# Patient Record
Sex: FEMALE | Race: BLACK OR AFRICAN AMERICAN | Employment: UNEMPLOYED | URBAN - METROPOLITAN AREA
[De-identification: names, ages, dates, MRNs, and addresses within clinical notes are randomized per-mention and may not be internally consistent; named-entity substitution may affect disease eponyms.]

---

## 2017-03-09 ENCOUNTER — HOSPITAL ENCOUNTER (EMERGENCY)
Age: 47
Discharge: HOME | End: 2017-03-09
Attending: EMERGENCY MEDICINE | Admitting: EMERGENCY MEDICINE
Payer: MEDICAID

## 2017-03-09 VITALS
SYSTOLIC BLOOD PRESSURE: 144 MMHG | WEIGHT: 190 LBS | DIASTOLIC BLOOD PRESSURE: 98 MMHG | BODY MASS INDEX: 32.44 KG/M2 | RESPIRATION RATE: 14 BRPM | HEIGHT: 64 IN | TEMPERATURE: 98.1 F | HEART RATE: 74 BPM | OXYGEN SATURATION: 100 %

## 2017-03-09 DIAGNOSIS — K04.7 DENTAL ABSCESS: Primary | ICD-10-CM

## 2017-03-09 PROCEDURE — 99282 EMERGENCY DEPT VISIT SF MDM: CPT

## 2017-03-09 RX ORDER — HYDROCODONE BITARTRATE AND ACETAMINOPHEN 5; 325 MG/1; MG/1
TABLET ORAL
Qty: 20 TAB | Refills: 0 | Status: SHIPPED | OUTPATIENT
Start: 2017-03-09

## 2017-03-09 RX ORDER — IBUPROFEN 800 MG/1
800 TABLET ORAL EVERY 8 HOURS
Qty: 15 TAB | Refills: 0 | Status: SHIPPED | OUTPATIENT
Start: 2017-03-09 | End: 2017-03-14

## 2017-03-09 RX ORDER — PENICILLIN V POTASSIUM 500 MG/1
500 TABLET, FILM COATED ORAL 4 TIMES DAILY
Qty: 40 TAB | Refills: 0 | Status: SHIPPED | OUTPATIENT
Start: 2017-03-09 | End: 2017-03-19

## 2017-03-09 RX ORDER — CLINDAMYCIN HYDROCHLORIDE 300 MG/1
300 CAPSULE ORAL 4 TIMES DAILY
Qty: 40 CAP | Refills: 0 | Status: SHIPPED | OUTPATIENT
Start: 2017-03-09 | End: 2017-03-19

## 2017-03-09 NOTE — ED TRIAGE NOTES
Patient states she has a \"lump\" on the upper roof of her mouth, onset 2 days    Vaginal discharge and bilateral knee pain

## 2017-03-09 NOTE — DISCHARGE INSTRUCTIONS

## 2017-03-09 NOTE — ED NOTES
Patient stated understanding of discharge instructions. Patient was ambulatory upon discharge. Patient received four prescriptions.     Patient armband removed and shredded

## 2017-03-09 NOTE — ED PROVIDER NOTES
Patient is a 42 y.o. female presenting with gum problem, vaginal discharge, and knee pain. The history is provided by the patient. Gum Problem        Vaginal Discharge    Pertinent negatives include no fever. Knee Pain         Pt presents today w/central complaint of dental pain and painful abscess just behind her upper central incisors. No meds other than ibuprofen taken for relief. Denies fever, drooling. Denies ETOH, illicits. +tobacco use. LNMP less than 2 weeks ago. History reviewed. No pertinent past medical history. Past Surgical History:   Procedure Laterality Date    HX GYN      fibroids         History reviewed. No pertinent family history. Social History     Social History    Marital status:      Spouse name: N/A    Number of children: N/A    Years of education: N/A     Occupational History    Not on file. Social History Main Topics    Smoking status: Current Every Day Smoker     Packs/day: 0.25    Smokeless tobacco: Not on file    Alcohol use Yes      Comment: occ    Drug use: No    Sexual activity: Not on file     Other Topics Concern    Not on file     Social History Narrative         ALLERGIES: Review of patient's allergies indicates no known allergies. Review of Systems   Constitutional: Negative for fever. HENT: Positive for dental problem. Negative for drooling. Genitourinary: Positive for vaginal discharge. All other systems reviewed and are negative. Vitals:    03/09/17 0720   BP: (!) 144/98   Pulse: 74   Resp: 14   Temp: 98.1 °F (36.7 °C)   SpO2: 100%   Weight: 86.2 kg (190 lb)   Height: 5' 4\" (1.626 m)            Physical Exam   Constitutional: She is oriented to person, place, and time. She appears well-developed. HENT:   Head: Normocephalic and atraumatic. Fluctuant tender lesion to gingiva/soft palate margin just proximal to upper central incisors which are also TTP. No trismus or drooling.    Eyes: Pupils are equal, round, and reactive to light. Neck: No JVD present. No tracheal deviation present. No thyromegaly present. Cardiovascular: Normal rate, regular rhythm and normal heart sounds. Exam reveals no gallop and no friction rub. No murmur heard. Pulmonary/Chest: Effort normal and breath sounds normal. No stridor. No respiratory distress. She has no wheezes. She has no rales. She exhibits no tenderness. Abdominal: Soft. She exhibits no distension and no mass. There is no tenderness. There is no rebound and no guarding. Musculoskeletal: She exhibits no edema or tenderness. Lymphadenopathy:     She has no cervical adenopathy. Neurological: She is alert and oriented to person, place, and time. Skin: Skin is warm and dry. No rash noted. No erythema. No pallor. Psychiatric: She has a normal mood and affect. Her behavior is normal. Thought content normal.   Nursing note and vitals reviewed. MDM  Number of Diagnoses or Management Options  Dental abscess:   Elevated blood pressure:   Diagnosis management comments: Differential: gingivitis; abscess; caries; malignancy    Advised pt to take pic w/cell phone and f/up with dental clinics. ED Course       Procedures    7:29 AM  Diagnosis:   1. Dental abscess    2.  Elevated blood pressure          Disposition: home    Follow-up Information     Follow up With Details Comments Contact Info    [id number] [facility] [address] Schedule an appointment as soon as possible for a visit today  [place] [address] [address]    [address] Schedule an appointment as soon as possible for a visit today  [facility]..  Rico Dawkins NP Schedule an appointment as soon as possible for a visit today  [address] [place] [address]  [phone]      [facility] EMERGENCY DEPT  If symptoms worsen return immediately [address]  [phone]          Patient's Medications Start Taking    CLINDAMYCIN (CLEOCIN) 300 MG CAPSULE    Take 1 Cap by mouth four (4) times daily for 10 days. HYDROCODONE-ACETAMINOPHEN (NORCO) 5-325 MG PER TABLET    Take 1-2 tablets PO every 4-6 hours as needed for pain control. If over the counter ibuprofen or acetaminophen was suggested, then only take the vicodin for pain not well controlled with the over the counter medication. IBUPROFEN (MOTRIN) 800 MG TABLET    Take 1 Tab by mouth every eight (8) hours for 5 days. PENICILLIN V POTASSIUM (VEETID) 500 MG TABLET    Take 1 Tab by mouth four (4) times daily for 10 days. Continue Taking    OXYCODONE-ACETAMINOPHEN (PERCOCET) 5-325 MG PER TABLET    Take 1 Tab by mouth every four (4) hours as needed (BREAKHTHROUGH PAIN ONLY, DO NOT FILL UNLESS PEN VK IS FILLED.).    These Medications have changed    No medications on file   Stop Taking    No medications on file

## 2024-11-18 ENCOUNTER — NEW PATIENT (OUTPATIENT)
Dept: RURAL CLINIC 1 | Facility: CLINIC | Age: 54
End: 2024-11-18

## 2024-11-18 DIAGNOSIS — H52.4: ICD-10-CM

## 2024-11-18 PROCEDURE — 92015 DETERMINE REFRACTIVE STATE: CPT

## 2024-11-18 PROCEDURE — 92004 COMPRE OPH EXAM NEW PT 1/>: CPT
